# Patient Record
Sex: MALE | Race: WHITE | NOT HISPANIC OR LATINO | Employment: UNEMPLOYED | URBAN - METROPOLITAN AREA
[De-identification: names, ages, dates, MRNs, and addresses within clinical notes are randomized per-mention and may not be internally consistent; named-entity substitution may affect disease eponyms.]

---

## 2017-01-01 ENCOUNTER — APPOINTMENT (OUTPATIENT)
Dept: RADIOLOGY | Facility: MEDICAL CENTER | Age: 62
DRG: 268 | End: 2017-01-01
Attending: HOSPITALIST
Payer: MEDICAID

## 2017-01-01 ENCOUNTER — APPOINTMENT (OUTPATIENT)
Dept: RADIOLOGY | Facility: MEDICAL CENTER | Age: 62
DRG: 268 | End: 2017-01-01
Attending: SURGERY
Payer: MEDICAID

## 2017-01-01 ENCOUNTER — HOSPITAL ENCOUNTER (INPATIENT)
Facility: MEDICAL CENTER | Age: 62
LOS: 1 days | DRG: 268 | End: 2017-03-06
Attending: INTERNAL MEDICINE | Admitting: INTERNAL MEDICINE
Payer: MEDICAID

## 2017-01-01 ENCOUNTER — RESOLUTE PROFESSIONAL BILLING HOSPITAL PROF FEE (OUTPATIENT)
Dept: HOSPITALIST | Facility: MEDICAL CENTER | Age: 62
End: 2017-01-01
Payer: MEDICAID

## 2017-01-01 VITALS
BODY MASS INDEX: 22.98 KG/M2 | HEART RATE: 43 BPM | TEMPERATURE: 98 F | HEIGHT: 67 IN | WEIGHT: 146.39 LBS | RESPIRATION RATE: 24 BRPM | OXYGEN SATURATION: 57 %

## 2017-01-01 LAB
ABO GROUP BLD: NORMAL
ABO GROUP BLD: NORMAL
ALBUMIN SERPL BCP-MCNC: 1.6 G/DL (ref 3.2–4.9)
ALBUMIN SERPL BCP-MCNC: 2 G/DL (ref 3.2–4.9)
ALBUMIN/GLOB SERPL: 1 G/DL
ALBUMIN/GLOB SERPL: ABNORMAL G/DL
ALP SERPL-CCNC: 26 U/L (ref 30–99)
ALP SERPL-CCNC: 42 U/L (ref 30–99)
ALT SERPL-CCNC: 225 U/L (ref 2–50)
ALT SERPL-CCNC: 30 U/L (ref 2–50)
ANION GAP SERPL CALC-SCNC: 10 MMOL/L (ref 0–11.9)
ANION GAP SERPL CALC-SCNC: 18 MMOL/L (ref 0–11.9)
ANISOCYTOSIS BLD QL SMEAR: ABNORMAL
AST SERPL-CCNC: 276 U/L (ref 12–45)
AST SERPL-CCNC: 40 U/L (ref 12–45)
BARCODED ABORH UBTYP: 5100
BARCODED ABORH UBTYP: 600
BARCODED ABORH UBTYP: 600
BARCODED ABORH UBTYP: 6200
BARCODED ABORH UBTYP: 7300
BARCODED ABORH UBTYP: 8400
BARCODED ABORH UBTYP: 8400
BARCODED PRD CODE UBPRD: NORMAL
BARCODED UNIT NUM UBUNT: NORMAL
BASE EXCESS BLDA CALC-SCNC: -12 MMOL/L (ref -4–3)
BASE EXCESS BLDA CALC-SCNC: -15 MMOL/L (ref -4–3)
BASE EXCESS BLDA CALC-SCNC: -19 MMOL/L (ref -4–3)
BASE EXCESS BLDA CALC-SCNC: -4 MMOL/L (ref -4–3)
BASE EXCESS BLDA CALC-SCNC: -6 MMOL/L (ref -4–3)
BASE EXCESS BLDA CALC-SCNC: -9 MMOL/L (ref -4–3)
BASOPHILS # BLD AUTO: 0 % (ref 0–1.8)
BASOPHILS # BLD: 0 K/UL (ref 0–0.12)
BILIRUB SERPL-MCNC: 0.8 MG/DL (ref 0.1–1.5)
BILIRUB SERPL-MCNC: 1 MG/DL (ref 0.1–1.5)
BLD GP AB SCN SERPL QL: NORMAL
BODY TEMPERATURE: ABNORMAL DEGREES
BUN SERPL-MCNC: 24 MG/DL (ref 8–22)
BUN SERPL-MCNC: 28 MG/DL (ref 8–22)
CA-I BLD ISE-SCNC: 0.47 MMOL/L (ref 1.1–1.3)
CA-I BLD ISE-SCNC: 0.67 MMOL/L (ref 1.1–1.3)
CA-I BLD ISE-SCNC: 0.89 MMOL/L (ref 1.1–1.3)
CA-I BLD ISE-SCNC: 0.91 MMOL/L (ref 1.1–1.3)
CA-I BLD ISE-SCNC: 0.93 MMOL/L (ref 1.1–1.3)
CA-I BLD ISE-SCNC: 0.95 MMOL/L (ref 1.1–1.3)
CALCIUM SERPL-MCNC: 6.7 MG/DL (ref 8.5–10.5)
CALCIUM SERPL-MCNC: 7 MG/DL (ref 8.5–10.5)
CFT BLD TEG: 6.8 MIN (ref 5–10)
CFT BLD TEG: 7.7 MIN (ref 5–10)
CFT P HPASE BLD TEG: 6.6 MIN (ref 5–10)
CHLORIDE SERPL-SCNC: 105 MMOL/L (ref 96–112)
CHLORIDE SERPL-SCNC: 106 MMOL/L (ref 96–112)
CLOT ANGLE BLD TEG: 40.2 DEGREES (ref 53–72)
CLOT ANGLE BLD TEG: 50.5 DEGREES (ref 53–72)
CLOT ANGLE P HPASE BLD TEG: 52.9 DEGREES (ref 53–72)
CLOT INIT P HPASE BLD TEG: 3.2 MIN (ref 1–3)
CLOT LYSIS 30M P MA LENFR BLD TEG: 0 % (ref 0–8)
CO2 BLDA-SCNC: 15 MMOL/L (ref 20–33)
CO2 BLDA-SCNC: 18 MMOL/L (ref 20–33)
CO2 BLDA-SCNC: 20 MMOL/L (ref 20–33)
CO2 BLDA-SCNC: 20 MMOL/L (ref 20–33)
CO2 BLDA-SCNC: 23 MMOL/L (ref 20–33)
CO2 BLDA-SCNC: 24 MMOL/L (ref 20–33)
CO2 SERPL-SCNC: 17 MMOL/L (ref 20–33)
CO2 SERPL-SCNC: 18 MMOL/L (ref 20–33)
COMPONENT FT 8504FT: NORMAL
COMPONENT P 8504P: NORMAL
COMPONENT R 8504R: NORMAL
CREAT SERPL-MCNC: 1.32 MG/DL (ref 0.5–1.4)
CREAT SERPL-MCNC: 1.55 MG/DL (ref 0.5–1.4)
CT.EXTRINSIC BLD ROTEM: 3.8 MIN (ref 1–3)
CT.EXTRINSIC BLD ROTEM: 4.8 MIN (ref 1–3)
EOSINOPHIL # BLD AUTO: 0 K/UL (ref 0–0.51)
EOSINOPHIL NFR BLD: 0 % (ref 0–6.9)
ERYTHROCYTE [DISTWIDTH] IN BLOOD BY AUTOMATED COUNT: 45.1 FL (ref 35.9–50)
ERYTHROCYTE [DISTWIDTH] IN BLOOD BY AUTOMATED COUNT: 49.7 FL (ref 35.9–50)
GFR SERPL CREATININE-BSD FRML MDRD: 46 ML/MIN/1.73 M 2
GFR SERPL CREATININE-BSD FRML MDRD: 55 ML/MIN/1.73 M 2
GLOBULIN SER CALC-MCNC: 2 G/DL (ref 1.9–3.5)
GLOBULIN SER CALC-MCNC: ABNORMAL G/DL (ref 1.9–3.5)
GLUCOSE BLD-MCNC: 139 MG/DL (ref 65–99)
GLUCOSE BLD-MCNC: 155 MG/DL (ref 65–99)
GLUCOSE BLD-MCNC: 187 MG/DL (ref 65–99)
GLUCOSE BLD-MCNC: 209 MG/DL (ref 65–99)
GLUCOSE BLD-MCNC: 212 MG/DL (ref 65–99)
GLUCOSE SERPL-MCNC: 144 MG/DL (ref 65–99)
GLUCOSE SERPL-MCNC: 146 MG/DL (ref 65–99)
HCO3 BLDA-SCNC: 13.2 MMOL/L (ref 17–25)
HCO3 BLDA-SCNC: 15.8 MMOL/L (ref 17–25)
HCO3 BLDA-SCNC: 18.1 MMOL/L (ref 17–25)
HCO3 BLDA-SCNC: 18.7 MMOL/L (ref 17–25)
HCO3 BLDA-SCNC: 21.2 MMOL/L (ref 17–25)
HCO3 BLDA-SCNC: 22.9 MMOL/L (ref 17–25)
HCT VFR BLD AUTO: 30 % (ref 42–52)
HCT VFR BLD AUTO: 35.2 % (ref 42–52)
HCT VFR BLD CALC: 15 % (ref 42–52)
HCT VFR BLD CALC: 16 % (ref 42–52)
HCT VFR BLD CALC: 23 % (ref 42–52)
HCT VFR BLD CALC: 23 % (ref 42–52)
HCT VFR BLD CALC: 25 % (ref 42–52)
HCT VFR BLD CALC: 28 % (ref 42–52)
HGB BLD-MCNC: 11.6 G/DL (ref 14–18)
HGB BLD-MCNC: 5.1 G/DL (ref 14–18)
HGB BLD-MCNC: 5.4 G/DL (ref 14–18)
HGB BLD-MCNC: 7.8 G/DL (ref 14–18)
HGB BLD-MCNC: 7.8 G/DL (ref 14–18)
HGB BLD-MCNC: 8.5 G/DL (ref 14–18)
HGB BLD-MCNC: 9.5 G/DL (ref 14–18)
HGB BLD-MCNC: 9.9 G/DL (ref 14–18)
INR PPP: 2.51 (ref 0.87–1.13)
LACTATE BLD-SCNC: 4.2 MMOL/L (ref 0.5–2)
LYMPHOCYTES # BLD AUTO: 0.63 K/UL (ref 1–4.8)
LYMPHOCYTES NFR BLD: 3.5 % (ref 22–41)
MAGNESIUM SERPL-MCNC: 2.1 MG/DL (ref 1.5–2.5)
MANUAL DIFF BLD: NORMAL
MCF BLD TEG: 46.6 MM (ref 50–70)
MCF BLD TEG: 51.9 MM (ref 50–70)
MCF P HPASE BLD TEG: 51.9 MM (ref 50–70)
MCH RBC QN AUTO: 29.1 PG (ref 27–33)
MCH RBC QN AUTO: 30.9 PG (ref 27–33)
MCHC RBC AUTO-ENTMCNC: 33 G/DL (ref 33.7–35.3)
MCHC RBC AUTO-ENTMCNC: 33 G/DL (ref 33.7–35.3)
MCV RBC AUTO: 88.2 FL (ref 81.4–97.8)
MCV RBC AUTO: 93.8 FL (ref 81.4–97.8)
MICROCYTES BLD QL SMEAR: ABNORMAL
MONOCYTES # BLD AUTO: 0.47 K/UL (ref 0–0.85)
MONOCYTES NFR BLD AUTO: 2.6 % (ref 0–13.4)
MORPHOLOGY BLD-IMP: NORMAL
MYELOCYTES NFR BLD MANUAL: 1.7 %
NEUTROPHILS # BLD AUTO: 16.5 K/UL (ref 1.82–7.42)
NEUTROPHILS NFR BLD: 89.6 % (ref 44–72)
NEUTS BAND NFR BLD MANUAL: 2.6 % (ref 0–10)
NRBC # BLD AUTO: 0.07 K/UL
NRBC BLD AUTO-RTO: 0.4 /100 WBC
O2/TOTAL GAS SETTING VFR VENT: 100 %
PA AA BLD-ACNC: 94.8 %
PA ADP BLD-ACNC: 90 %
PCO2 BLDA: 48.7 MMHG (ref 26–37)
PCO2 BLDA: 52.6 MMHG (ref 26–37)
PCO2 BLDA: 54 MMHG (ref 26–37)
PCO2 BLDA: 65.1 MMHG (ref 26–37)
PCO2 BLDA: 65.8 MMHG (ref 26–37)
PCO2 BLDA: 66.8 MMHG (ref 26–37)
PCO2 TEMP ADJ BLDA: 48.7 MMHG (ref 26–37)
PCO2 TEMP ADJ BLDA: 50.3 MMHG (ref 26–37)
PCO2 TEMP ADJ BLDA: 54 MMHG (ref 26–37)
PCO2 TEMP ADJ BLDA: 61.4 MMHG (ref 26–37)
PCO2 TEMP ADJ BLDA: 65.1 MMHG (ref 26–37)
PCO2 TEMP ADJ BLDA: 66.8 MMHG (ref 26–37)
PH BLDA: 6.9 [PH] (ref 7.4–7.5)
PH BLDA: 6.99 [PH] (ref 7.4–7.5)
PH BLDA: 7.05 [PH] (ref 7.4–7.5)
PH BLDA: 7.15 [PH] (ref 7.4–7.5)
PH BLDA: 7.21 [PH] (ref 7.4–7.5)
PH BLDA: 7.28 [PH] (ref 7.4–7.5)
PH TEMP ADJ BLDA: 6.9 [PH] (ref 7.4–7.5)
PH TEMP ADJ BLDA: 7.01 [PH] (ref 7.4–7.5)
PH TEMP ADJ BLDA: 7.05 [PH] (ref 7.4–7.5)
PH TEMP ADJ BLDA: 7.15 [PH] (ref 7.4–7.5)
PH TEMP ADJ BLDA: 7.23 [PH] (ref 7.4–7.5)
PH TEMP ADJ BLDA: 7.28 [PH] (ref 7.4–7.5)
PLATELET # BLD AUTO: 67 K/UL (ref 164–446)
PLATELET # BLD AUTO: 89 K/UL (ref 164–446)
PLATELET BLD QL SMEAR: NORMAL
PMV BLD AUTO: 9.3 FL (ref 9–12.9)
PMV BLD AUTO: 9.9 FL (ref 9–12.9)
PO2 BLDA: 239 MMHG (ref 64–87)
PO2 BLDA: 25 MMHG (ref 64–87)
PO2 BLDA: 277 MMHG (ref 64–87)
PO2 BLDA: 327 MMHG (ref 64–87)
PO2 BLDA: 414 MMHG (ref 64–87)
PO2 BLDA: 425 MMHG (ref 64–87)
PO2 TEMP ADJ BLDA: 23 MMHG (ref 64–87)
PO2 TEMP ADJ BLDA: 239 MMHG (ref 64–87)
PO2 TEMP ADJ BLDA: 277 MMHG (ref 64–87)
PO2 TEMP ADJ BLDA: 322 MMHG (ref 64–87)
PO2 TEMP ADJ BLDA: 414 MMHG (ref 64–87)
PO2 TEMP ADJ BLDA: 425 MMHG (ref 64–87)
POTASSIUM BLD-SCNC: 4.7 MMOL/L (ref 3.6–5.5)
POTASSIUM BLD-SCNC: 5.5 MMOL/L (ref 3.6–5.5)
POTASSIUM BLD-SCNC: 5.5 MMOL/L (ref 3.6–5.5)
POTASSIUM BLD-SCNC: 5.6 MMOL/L (ref 3.6–5.5)
POTASSIUM BLD-SCNC: 5.8 MMOL/L (ref 3.6–5.5)
POTASSIUM BLD-SCNC: 6 MMOL/L (ref 3.6–5.5)
POTASSIUM SERPL-SCNC: 5 MMOL/L (ref 3.6–5.5)
POTASSIUM SERPL-SCNC: 5.2 MMOL/L (ref 3.6–5.5)
PRODUCT TYPE UPROD: NORMAL
PROT SERPL-MCNC: 4 G/DL (ref 6–8.2)
PROT SERPL-MCNC: <3 G/DL (ref 6–8.2)
PROTHROMBIN TIME: 27.9 SEC (ref 12–14.6)
RBC # BLD AUTO: 3.2 M/UL (ref 4.7–6.1)
RBC # BLD AUTO: 3.99 M/UL (ref 4.7–6.1)
RBC BLD AUTO: PRESENT
RH BLD: NORMAL
SAO2 % BLDA: 100 % (ref 93–99)
SAO2 % BLDA: 23 % (ref 93–99)
SAO2 % BLDA: 99 % (ref 93–99)
SODIUM BLD-SCNC: 134 MMOL/L (ref 135–145)
SODIUM BLD-SCNC: 138 MMOL/L (ref 135–145)
SODIUM BLD-SCNC: 138 MMOL/L (ref 135–145)
SODIUM BLD-SCNC: 139 MMOL/L (ref 135–145)
SODIUM BLD-SCNC: 140 MMOL/L (ref 135–145)
SODIUM BLD-SCNC: 146 MMOL/L (ref 135–145)
SODIUM SERPL-SCNC: 132 MMOL/L (ref 135–145)
SODIUM SERPL-SCNC: 142 MMOL/L (ref 135–145)
SPECIMEN DRAWN FROM PATIENT: ABNORMAL
TEG ALGORITHM TGALG: ABNORMAL
TEG ALGORITHM TGALG: ABNORMAL
UNIT STATUS USTAT: NORMAL
WBC # BLD AUTO: 17.9 K/UL (ref 4.8–10.8)
WBC # BLD AUTO: 5.2 K/UL (ref 4.8–10.8)

## 2017-01-01 PROCEDURE — C1751 CATH, INF, PER/CENT/MIDLINE: HCPCS | Performed by: SURGERY

## 2017-01-01 PROCEDURE — A7000 DISPOSABLE CANISTER FOR PUMP: HCPCS | Performed by: INTERNAL MEDICINE

## 2017-01-01 PROCEDURE — 99291 CRITICAL CARE FIRST HOUR: CPT | Mod: 25 | Performed by: SURGERY

## 2017-01-01 PROCEDURE — 30243N1 TRANSFUSION OF NONAUTOLOGOUS RED BLOOD CELLS INTO CENTRAL VEIN, PERCUTANEOUS APPROACH: ICD-10-PCS | Performed by: ANESTHESIOLOGY

## 2017-01-01 PROCEDURE — 5A1935Z RESPIRATORY VENTILATION, LESS THAN 24 CONSECUTIVE HOURS: ICD-10-PCS | Performed by: EMERGENCY MEDICINE

## 2017-01-01 PROCEDURE — 85007 BL SMEAR W/DIFF WBC COUNT: CPT

## 2017-01-01 PROCEDURE — 700105 HCHG RX REV CODE 258

## 2017-01-01 PROCEDURE — 700111 HCHG RX REV CODE 636 W/ 250 OVERRIDE (IP): Performed by: PHARMACIST

## 2017-01-01 PROCEDURE — 49000 EXPLORATION OF ABDOMEN: CPT | Performed by: SURGERY

## 2017-01-01 PROCEDURE — C1894 INTRO/SHEATH, NON-LASER: HCPCS | Performed by: SURGERY

## 2017-01-01 PROCEDURE — P9017 PLASMA 1 DONOR FRZ W/IN 8 HR: HCPCS | Mod: 91

## 2017-01-01 PROCEDURE — 110382 HCHG SHELL REV 271: Performed by: SURGERY

## 2017-01-01 PROCEDURE — 85347 COAGULATION TIME ACTIVATED: CPT | Mod: 91

## 2017-01-01 PROCEDURE — 92950 HEART/LUNG RESUSCITATION CPR: CPT

## 2017-01-01 PROCEDURE — 160048 HCHG OR STATISTICAL LEVEL 1-5: Performed by: SURGERY

## 2017-01-01 PROCEDURE — 85027 COMPLETE CBC AUTOMATED: CPT | Mod: 91

## 2017-01-01 PROCEDURE — 770022 HCHG ROOM/CARE - ICU (200)

## 2017-01-01 PROCEDURE — A4606 OXYGEN PROBE USED W OXIMETER: HCPCS | Performed by: SURGERY

## 2017-01-01 PROCEDURE — 160041 HCHG SURGERY MINUTES - EA ADDL 1 MIN LEVEL 4: Performed by: SURGERY

## 2017-01-01 PROCEDURE — 502240 HCHG MISC OR SUPPLY RC 0272: Performed by: SURGERY

## 2017-01-01 PROCEDURE — 500002 HCHG ADHESIVE, DERMABOND: Performed by: SURGERY

## 2017-01-01 PROCEDURE — 700101 HCHG RX REV CODE 250

## 2017-01-01 PROCEDURE — 85384 FIBRINOGEN ACTIVITY: CPT | Mod: 91

## 2017-01-01 PROCEDURE — 04V03E6 RESTRICT ABD AORTA, BIFURC, W FENESTR DEV 1 OR 2, PERC: ICD-10-PCS | Performed by: SURGERY

## 2017-01-01 PROCEDURE — 700111 HCHG RX REV CODE 636 W/ 250 OVERRIDE (IP)

## 2017-01-01 PROCEDURE — 84132 ASSAY OF SERUM POTASSIUM: CPT | Mod: 91

## 2017-01-01 PROCEDURE — 3E043XZ INTRODUCTION OF VASOPRESSOR INTO CENTRAL VEIN, PERCUTANEOUS APPROACH: ICD-10-PCS | Performed by: INTERNAL MEDICINE

## 2017-01-01 PROCEDURE — 500257: Performed by: SURGERY

## 2017-01-01 PROCEDURE — 85014 HEMATOCRIT: CPT | Mod: 91

## 2017-01-01 PROCEDURE — P9016 RBC LEUKOCYTES REDUCED: HCPCS | Mod: 91

## 2017-01-01 PROCEDURE — 86850 RBC ANTIBODY SCREEN: CPT

## 2017-01-01 PROCEDURE — 501499 HCHG SURG-I-LOOP, MINI (BLUE): Performed by: SURGERY

## 2017-01-01 PROCEDURE — 0BH18EZ INSERTION OF ENDOTRACHEAL AIRWAY INTO TRACHEA, VIA NATURAL OR ARTIFICIAL OPENING ENDOSCOPIC: ICD-10-PCS | Performed by: EMERGENCY MEDICINE

## 2017-01-01 PROCEDURE — 80053 COMPREHEN METABOLIC PANEL: CPT | Mod: 91

## 2017-01-01 PROCEDURE — 501500 HCHG SURG-I-LOOP, SUPER MAXI (BLUE): Performed by: SURGERY

## 2017-01-01 PROCEDURE — 86900 BLOOD TYPING SEROLOGIC ABO: CPT

## 2017-01-01 PROCEDURE — 94002 VENT MGMT INPAT INIT DAY: CPT

## 2017-01-01 PROCEDURE — 110371 HCHG SHELL REV 272: Performed by: SURGERY

## 2017-01-01 PROCEDURE — 0WJG0ZZ INSPECTION OF PERITONEAL CAVITY, OPEN APPROACH: ICD-10-PCS | Performed by: SURGERY

## 2017-01-01 PROCEDURE — C1769 GUIDE WIRE: HCPCS | Performed by: SURGERY

## 2017-01-01 PROCEDURE — 86923 COMPATIBILITY TEST ELECTRIC: CPT | Mod: 91

## 2017-01-01 PROCEDURE — 160009 HCHG ANES TIME/MIN: Performed by: SURGERY

## 2017-01-01 PROCEDURE — 500123 HCHG BOVIE, CONTROL W/BLADE: Performed by: SURGERY

## 2017-01-01 PROCEDURE — P9045 ALBUMIN (HUMAN), 5%, 250 ML: HCPCS

## 2017-01-01 PROCEDURE — 86901 BLOOD TYPING SEROLOGIC RH(D): CPT

## 2017-01-01 PROCEDURE — 501488 HCHG SUCTION CANN, WOUNDVAC TRAC: Performed by: SURGERY

## 2017-01-01 PROCEDURE — 93312 ECHO TRANSESOPHAGEAL: CPT

## 2017-01-01 PROCEDURE — 500700 HCHG HEMOCLIP, SMALL (RED): Performed by: SURGERY

## 2017-01-01 PROCEDURE — 500043 HCHG BAG-A-JET: Performed by: SURGERY

## 2017-01-01 PROCEDURE — 500896 HCHG PACK, VASCULAR (FOR ROOM 10): Performed by: SURGERY

## 2017-01-01 PROCEDURE — 0W9G0ZZ DRAINAGE OF PERITONEAL CAVITY, OPEN APPROACH: ICD-10-PCS | Performed by: SURGERY

## 2017-01-01 PROCEDURE — 82962 GLUCOSE BLOOD TEST: CPT

## 2017-01-01 PROCEDURE — 500698 HCHG HEMOCLIP, MEDIUM: Performed by: SURGERY

## 2017-01-01 PROCEDURE — 85610 PROTHROMBIN TIME: CPT | Mod: 91

## 2017-01-01 PROCEDURE — 82947 ASSAY GLUCOSE BLOOD QUANT: CPT

## 2017-01-01 PROCEDURE — P9034 PLATELETS, PHERESIS: HCPCS | Mod: 91

## 2017-01-01 PROCEDURE — 84295 ASSAY OF SERUM SODIUM: CPT | Mod: 91

## 2017-01-01 PROCEDURE — 160029 HCHG SURGERY MINUTES - 1ST 30 MINS LEVEL 4: Performed by: SURGERY

## 2017-01-01 PROCEDURE — A6402 STERILE GAUZE <= 16 SQ IN: HCPCS | Performed by: SURGERY

## 2017-01-01 PROCEDURE — 83735 ASSAY OF MAGNESIUM: CPT

## 2017-01-01 PROCEDURE — 501837 HCHG SUTURE CV: Performed by: SURGERY

## 2017-01-01 PROCEDURE — 93321 DOPPLER ECHO F-UP/LMTD STD: CPT

## 2017-01-01 PROCEDURE — 82330 ASSAY OF CALCIUM: CPT | Mod: 91

## 2017-01-01 PROCEDURE — 700105 HCHG RX REV CODE 258: Performed by: INTERNAL MEDICINE

## 2017-01-01 PROCEDURE — 85576 BLOOD PLATELET AGGREGATION: CPT | Mod: 91

## 2017-01-01 PROCEDURE — 31500 INSERT EMERGENCY AIRWAY: CPT

## 2017-01-01 PROCEDURE — 93325 DOPPLER ECHO COLOR FLOW MAPG: CPT

## 2017-01-01 PROCEDURE — 500869 HCHG NEEDLE, THINWALL 19GA (COOK): Performed by: SURGERY

## 2017-01-01 PROCEDURE — 99292 CRITICAL CARE ADDL 30 MIN: CPT | Mod: 25 | Performed by: SURGERY

## 2017-01-01 PROCEDURE — 99291 CRITICAL CARE FIRST HOUR: CPT | Performed by: INTERNAL MEDICINE

## 2017-01-01 PROCEDURE — 700105 HCHG RX REV CODE 258: Performed by: PHARMACIST

## 2017-01-01 PROCEDURE — 500122 HCHG BOVIE, BLADE: Performed by: SURGERY

## 2017-01-01 PROCEDURE — 502000 HCHG MISC OR IMPLANTS RC 0278: Performed by: SURGERY

## 2017-01-01 PROCEDURE — 36430 TRANSFUSION BLD/BLD COMPNT: CPT

## 2017-01-01 PROCEDURE — 30243K1 TRANSFUSION OF NONAUTOLOGOUS FROZEN PLASMA INTO CENTRAL VEIN, PERCUTANEOUS APPROACH: ICD-10-PCS | Performed by: ANESTHESIOLOGY

## 2017-01-01 PROCEDURE — 501445 HCHG STAPLER, SKIN DISP: Performed by: SURGERY

## 2017-01-01 PROCEDURE — 501498 HCHG SURG-I-LOOP, MAXI (BLUE): Performed by: SURGERY

## 2017-01-01 PROCEDURE — 500887 HCHG PACK, MAJOR BASIN: Performed by: SURGERY

## 2017-01-01 PROCEDURE — 82803 BLOOD GASES ANY COMBINATION: CPT | Mod: 91

## 2017-01-01 PROCEDURE — 83605 ASSAY OF LACTIC ACID: CPT

## 2017-01-01 PROCEDURE — 700111 HCHG RX REV CODE 636 W/ 250 OVERRIDE (IP): Performed by: INTERNAL MEDICINE

## 2017-01-01 PROCEDURE — 30243R1 TRANSFUSION OF NONAUTOLOGOUS PLATELETS INTO CENTRAL VEIN, PERCUTANEOUS APPROACH: ICD-10-PCS | Performed by: ANESTHESIOLOGY

## 2017-01-01 PROCEDURE — 501838 HCHG SUTURE GENERAL: Performed by: SURGERY

## 2017-01-01 PROCEDURE — 500697 HCHG HEMOCLIP, LARGE (ORANGE): Performed by: SURGERY

## 2017-01-01 PROCEDURE — 700111 HCHG RX REV CODE 636 W/ 250 OVERRIDE (IP): Performed by: EMERGENCY MEDICINE

## 2017-01-01 PROCEDURE — 500424 HCHG DRESSING, AIRSTRIP: Performed by: SURGERY

## 2017-01-01 DEVICE — IMPLANTABLE DEVICE: Type: IMPLANTABLE DEVICE | Status: FUNCTIONAL

## 2017-01-01 RX ORDER — MIDAZOLAM HYDROCHLORIDE 1 MG/ML
1-5 INJECTION INTRAMUSCULAR; INTRAVENOUS
Status: DISCONTINUED | OUTPATIENT
Start: 2017-01-01 | End: 2017-03-07 | Stop reason: HOSPADM

## 2017-01-01 RX ORDER — ONDANSETRON 2 MG/ML
4 INJECTION INTRAMUSCULAR; INTRAVENOUS EVERY 4 HOURS PRN
Status: CANCELLED | OUTPATIENT
Start: 2017-01-01

## 2017-01-01 RX ORDER — CALCIUM CHLORIDE 100 MG/ML
INJECTION INTRAVENOUS; INTRAVENTRICULAR
Status: COMPLETED
Start: 2017-01-01 | End: 2017-01-01

## 2017-01-01 RX ORDER — SUCCINYLCHOLINE CHLORIDE 20 MG/ML
100 INJECTION INTRAMUSCULAR; INTRAVENOUS ONCE
Status: COMPLETED | OUTPATIENT
Start: 2017-01-01 | End: 2017-01-01

## 2017-01-01 RX ORDER — MORPHINE SULFATE 4 MG/ML
1-5 INJECTION, SOLUTION INTRAMUSCULAR; INTRAVENOUS
Status: DISCONTINUED | OUTPATIENT
Start: 2017-01-01 | End: 2017-01-01

## 2017-01-01 RX ORDER — SODIUM CHLORIDE 9 MG/ML
INJECTION, SOLUTION INTRAVENOUS
Status: DISCONTINUED
Start: 2017-01-01 | End: 2017-03-07 | Stop reason: HOSPADM

## 2017-01-01 RX ORDER — MORPHINE SULFATE 4 MG/ML
1-4 INJECTION, SOLUTION INTRAMUSCULAR; INTRAVENOUS
Status: DISCONTINUED | OUTPATIENT
Start: 2017-01-01 | End: 2017-03-07 | Stop reason: HOSPADM

## 2017-01-01 RX ORDER — MORPHINE SULFATE 10 MG/ML
5 INJECTION, SOLUTION INTRAMUSCULAR; INTRAVENOUS
Status: DISCONTINUED | OUTPATIENT
Start: 2017-01-01 | End: 2017-03-07 | Stop reason: HOSPADM

## 2017-01-01 RX ORDER — IODIXANOL 270 MG/ML
INJECTION, SOLUTION INTRAVASCULAR
Status: DISCONTINUED | OUTPATIENT
Start: 2017-01-01 | End: 2017-01-01 | Stop reason: HOSPADM

## 2017-01-01 RX ORDER — EPINEPHRINE 0.1 MG/ML
SYRINGE (ML) INJECTION
Status: COMPLETED
Start: 2017-01-01 | End: 2017-01-01

## 2017-01-01 RX ORDER — ONDANSETRON 2 MG/ML
4 INJECTION INTRAMUSCULAR; INTRAVENOUS EVERY 4 HOURS PRN
Status: DISCONTINUED | OUTPATIENT
Start: 2017-01-01 | End: 2017-03-07 | Stop reason: HOSPADM

## 2017-01-01 RX ORDER — SODIUM CHLORIDE 9 MG/ML
INJECTION, SOLUTION INTRAVENOUS CONTINUOUS
Status: DISCONTINUED | OUTPATIENT
Start: 2017-01-01 | End: 2017-03-07 | Stop reason: HOSPADM

## 2017-01-01 RX ORDER — PROPOFOL 10 MG/ML
50 INJECTION, EMULSION INTRAVENOUS ONCE
Status: COMPLETED | OUTPATIENT
Start: 2017-01-01 | End: 2017-01-01

## 2017-01-01 RX ORDER — NOREPINEPHRINE BITARTRATE 1 MG/ML
INJECTION, SOLUTION INTRAVENOUS
Status: DISCONTINUED
Start: 2017-01-01 | End: 2017-03-07 | Stop reason: HOSPADM

## 2017-01-01 RX ADMIN — VASOPRESSIN 0.03 UNITS/MIN: 20 INJECTION INTRAVENOUS at 21:35

## 2017-01-01 RX ADMIN — CALCIUM CHLORIDE 1 G: 100 INJECTION, SOLUTION INTRAVENOUS; INTRAVENTRICULAR at 21:52

## 2017-01-01 RX ADMIN — SUCCINYLCHOLINE CHLORIDE 100 MG: 20 INJECTION, SOLUTION INTRAMUSCULAR; INTRAVENOUS at 17:18

## 2017-01-01 RX ADMIN — PROPOFOL 50 MG: 10 INJECTION, EMULSION INTRAVENOUS at 17:18

## 2017-01-01 RX ADMIN — SODIUM BICARBONATE: 84 INJECTION, SOLUTION INTRAVENOUS at 21:53

## 2017-01-01 RX ADMIN — EPINEPHRINE 1 MG: 0.1 INJECTION, SOLUTION ENDOTRACHEAL; INTRACARDIAC; INTRAVENOUS at 21:54

## 2017-01-01 RX ADMIN — SODIUM CHLORIDE: 9 INJECTION, SOLUTION INTRAVENOUS at 21:35

## 2017-01-01 ASSESSMENT — PAIN SCALES - GENERAL: PAINLEVEL_OUTOF10: 0

## 2017-03-06 PROBLEM — I71.40 ANEURYSM OF ABDOMINAL AORTA (HCC): Status: ACTIVE | Noted: 2017-01-01

## 2017-03-06 PROBLEM — E87.20 METABOLIC ACIDOSIS: Status: ACTIVE | Noted: 2017-01-01

## 2017-03-06 PROBLEM — J96.00 ACUTE RESPIRATORY FAILURE (HCC): Status: ACTIVE | Noted: 2017-01-01

## 2017-03-06 PROBLEM — D68.9 COAGULOPATHY (HCC): Status: ACTIVE | Noted: 2017-01-01

## 2017-03-06 PROBLEM — I71.30 RUPTURED ABDOMINAL AORTIC ANEURYSM (AAA) (HCC): Status: ACTIVE | Noted: 2017-01-01

## 2017-03-06 PROBLEM — R57.1 HYPOVOLEMIC SHOCK (HCC): Status: ACTIVE | Noted: 2017-01-01

## 2017-03-06 PROBLEM — M79.A3 NON-TRAUMATIC COMPARTMENT SYNDROME OF ABDOMEN: Status: ACTIVE | Noted: 2017-01-01

## 2017-03-06 NOTE — PROGRESS NOTES
Direct admit from [facility], Dr. Salazar.  Accepted by Dr. Tucker for ruptured abd aortic aneursym.  ADT signed & held @ 1070, needs to be released upon pt arrival.  No written orders received.  Pt coming by air. Dr. Sutherland has accepted as consult/vascular surgeon.

## 2017-03-07 LAB
INR PPP: 2.43 (ref 0.87–1.13)
PROTHROMBIN TIME: 27.2 SEC (ref 12–14.6)

## 2017-03-07 NOTE — PROGRESS NOTES
1700: Dr. Sutherland at bedside.   1705: Dr. Tucker at bedside.    1715: Orders received. Patient to go to OR, labs sent.  Patient is now obtunded, not verbally responsive, orders to intubate patient now.  Call placed to ER for ERP to come to bedside to intubate patient. One liter IVF bolus initiated prior to intubation.

## 2017-03-07 NOTE — PROGRESS NOTES
1605: Patient arrived to unit via care flight and [facility].  Unit #5 of 6 of PRBC's hanging and infusing at this time.  Levophed infusing at 15 mcg/min and vasopressin at 0.06 units/hr.  Patient is awake and alert, pale and diaphoretic, on 10 L oxy mask.    1615: Dr. Vanegas and Dr. Sutherland paged regarding patient's arrival on unit.

## 2017-03-07 NOTE — CONSULTS
DATE OF SERVICE:  03/06/2017    REASON FOR CONSULTATION:  Ruptured abdominal aortic aneurysm.    HISTORY OF PRESENT ILLNESS:  This is a 61-year-old gentleman with a history of   COPD on home oxygen, tobacco dependency and methamphetamine dependence who   presented to the emergency room at [place] with worsening cough and weakness.    The patient was found to be in septic and was treated for possible pneumonia.   He acutely decompensated earlier today and subsequently had a CT scan   performed demonstrating a ruptured abdominal aortic aneurysm with a large   retroperitoneal hematoma.  The patient was transferred to the [facility] for evaluation and treatment.  The patient wished to   have the aneurysm repaired according to the physician at the outside hospital   prior to transfer.    PAST MEDICAL HISTORY:  1.  Nicotine dependence.  2.  Chronic obstructive pulmonary disease, on home oxygen.  3.  Methamphetamine dependence.    ALLERGIES:  No known drug allergies.    CURRENT HOME MEDICATIONS:  1.  Albuterol nebulizer.  2.  Lexapro 5 mg daily.    FAMILY HISTORY:  Father had coronary artery disease and myocardial infarction.    SOCIAL HISTORY:  The patient smokes about 1 packs of cigarettes daily.  He   also has recreational drug use history.    REVIEW OF SYSTEMS:  Negative except what is stated within the history and   physical.    PHYSICAL EXAMINATION:  VITAL SIGNS:  Temperature is 36.7, pulse is 134, blood pressure is 78/51 on 10   liters with a mask.  GENERAL:  Well-developed, ill-appearing male in no acute distress.  The   patient is not responsive to verbal communication.  HEENT:  Atraumatic, normocephalic.  Pupils are equal, round, and reactive to   light.  Extraocular muscles are intact.  Mucous membranes are dry.  Poor   dentition.  NECK:  Supple.  No lymphadenopathy or carotid bruit.  Trachea is midline.  CHEST:  Symmetric expansion.  No intercostal retractions or accessory muscle    use.  RESPIRATORY:  Clear to auscultation with diminished breath sounds at bases.  CARDIOVASCULAR:  Tachycardia with regular rhythm.  No murmurs.  ABDOMEN:  Soft, nondistended with pulsatile abdominal mass noted along the   left side with firm hematoma.  EXTREMITIES:  Nonpalpable pedal pulses.  No edema.  Femoral pulses are   difficult to palpate.  NEUROLOGIC:  The patient is nonresponse.  He is moving all 4 extremities. Gross strength appeared to be intact.  SKIN:  Clammy and cold along the bilateral lower extremities.    IMAGING:  CT scan of the abdomen and pelvis demonstrates a ruptured infrarenal   abdominal aortic aneurysm with a relatively large retroperitoneal hematoma.    ASSESSMENT AND PLAN:  This is a 59-year-old gentleman with a ruptured   infrarenal abdominal aortic aneurysm.  I reviewed the CT scan.  He is a   candidate for endovascular repair.  The patient will be taken emergently to the   operating room for repair of his abdominal aortic aneurysm.  Emergency consent  was obtained with 2-physician consent.       ____________________________________     MD AMARIS HARRELL / ANIL    DD:  03/06/2017 21:42:18  DT:  03/06/2017 22:48:38    D#:  [id number]  Job#:  [id number]

## 2017-03-07 NOTE — RESPIRATORY CARE
Cardiopulmonary Resuscitation/Intubation Assist    Intubation assist performed yes  Reason for intubation resp. failure  Positive Color Change on EZCap? yes  Difficult Intubation/Number of attempts no/2  Evidence of aspiration no  Airway Group ET Tube Oral 8.0-Secured At  (cm): 24 (03/06/17 1720)                         Events/Summary/Plan: Pt. intubated in unit and then rushed to O.R. (03/06/17 1720)

## 2017-03-07 NOTE — DISCHARGE SUMMARY
Discharge Summary      DATE OF ADMISSION: 3/6/2017    DATE OF DISCHARGE: 3/6/2017    ADMISSION DIAGNOSIS (ES):  ? Ruptured AAA  ? Acidosis  ? Coagulopathy  ? Respiratory failure  ? DNR status    DISCHARGE DIAGNOSIS (ES):  ? Ruptured AAA  ? Acidosis  ? Coagulopathy  ? Respiratory failure  ? Abdominal compartment syndrome    DISCHARGE CONDITION:  ? death    CONSULTATIONS:  ? Dr. Sutherland, vascular surgery  Yamil Morales MD - general surgery, surgical critical care      PROCEDURES:  ? Endotracheal intubation  ? Endograft repair of ruptured AAA  ? Exploratory laparotomy for abdominal compartment syndrome    BRIEF HPI:  ? 61 year old male transferred from [place], [place] in shock with ruptured AAA. He was taken to the OR for endograft repair, massive resuscitation and required decompressive laparotomy. He developed worsening acidosis, coagulopathy, and shock. He was DNR status and  shortly after return to the ICU post op of PEA arrest.

## 2017-03-07 NOTE — PROGRESS NOTES
1630: Call back from Dr. Sutherland received.  Discussed patients vital signs (hypotensive, maxed on two vasopressors, tachycardic in the 120's), patient is cold clammy and diaphoretic, blood infusing.  States he will be at the bedside and to have the cat scans from [place] available.

## 2017-03-07 NOTE — OP REPORT
DATE OF SERVICE:  03/06/2017    PREOPERATIVE DIAGNOSES:  Abdominal compartment syndrome, status post ruptured   abdominal aortic aneurysm.    POSTOPERATIVE DIAGNOSES:  Abdominal compartment syndrome, status post ruptured   abdominal aortic aneurysm.    PROCEDURES PERFORMED:  Decompressive exploratory laparotomy.     SURGEON:  Yamil Morales MD     ASSISTANT:  Otilio Sutherland MD    ANESTHESIA:  General endotracheal.    ESTIMATED BLOOD LOSS:  There is no new blood loss from this portion of the   operation, but approximately 5 liters of blood was evacuated from the abdomen   from his ruptured AAA.     SPECIMENS:  None.    INDICATIONS:  The patient is a 61-year-old male transferred from [place], [place] with a ruptured abdominal aortic aneurysm.  He was intubated and   taken urgently to the operating room.  He had an endograft repair of his   ruptured abdominal aortic aneurysm by Dr. Sutherland and Dr. Richards.  I was   called as the on-call trauma surgeon to evaluate him for abdominal compartment   syndrome.  When I evaluated the patient, he had an extremely tense abdomen,   was anuric with elevated peak airway pressures on the ventilator in the   operating room.  He is therefore indicated for decompressive exploratory   laparotomy.    FINDINGS:  He had approximately 5 liters of liquid blood in his abdominal   cavity.  This was evacuated and an ABThera device was placed.     PROCEDURE IN DETAIL:  The patient was already intubated in the operating room   and his abdomen had previously been sterilely prepped and draped.  A midline   laparotomy was made with a knife.  Hemostasis in the subcutaneous region was   obtained with cautery.  The abdomen was entered sharply and approximately 5   liters of liquid blood was evacuated using the cell saving device.  The   fascial incision was then extended from just above the bladder up to the   epigastric region.  Once the entire abdomen evacuated, it was considerably   softer.   At this point, the patient was hypotensive and deemed to be   coagulopathic, no further exploration was made then to briefly explore both   gutters and the retroperitoneum to be sure there was no ongoing active   bleeding.  I then placed an ABThera temporary abdominal closure device and   stapled the sponge to the skin with skin staples.  The ABThera was hooked up   to suction with good seal.  The patient's groin dressings were sterilely   covered and he was then transported to the intensive care unit in critical   condition.  Lap, sponge, and needle counts were correct at the conclusion of   my case.  Plan will be return to the operating room within 24-48 hours for   reexploration and evaluation for fascial closure.       ____________________________________     MD JESSICA Howard / NTS    DD:  03/06/2017 22:08:02  DT:  03/06/2017 22:19:53    D#:  [id number]  Job#:  [id number]

## 2017-03-07 NOTE — H&P
CHIEF COMPLAINT:  Transferred from outside hospital for ruptured AAA.    HISTORY OF PRESENT ILLNESS:  The patient is a 61-year-old male with a history   of hypertension, meth use, COPD.  Also, has a history of tobacco abuse.  All   the history is obtained both from the chart review and talking to the outside   physician as the patient is completely obtunded at this time.  Patient   presented to the hospital in [place] on the 4th with shortness of breath, chest   pain, and pneumonia.  He had HCAP and sepsis and he was treated accordingly   with sepsis protocol appropriate antibiotics.  The patient did well actually   and was improving and this morning had sudden change in blood pressure, had   sudden left-sided pain, left testicular pain, his hemoglobin dropped from 10   to 6.  I gave him 4 units of packed red blood cells, positive central line,   put him on Levophed.  We got a stat CTA abdomen and pelvis that showed a   ruptured infrarenal abdominal aortic aneurysm, active hemorrhage directed   laterally to the left of large retroperitoneal hematoma measuring 26 cm,   craniocaudad by 15 cm, AP by 15 cm transverse displacing the left kidney and   abdominal structures anteriorly and superiorly.  Also, it has associated small   volume hemoperitoneum.  There was also a pleural base consolidation change   posteriorly in the lower lobes with a low density pleural effusion,   concomitant pneumonia superimposed on underlying edema not excluded.  Also   showed fatty liver of, a few small subcentimeter low density consistent   with no spondylosis or prominent L5-S1.    The patient was emergently transferred here in the CICU.  Dr. Sutherland evaluated   CAT scans, recommend he emergently go to the OR.  He was not intubated.  His   breathing was shallow, tachypneic in the high 30s and unresponsive.  We   emergently intubated with Dr. Galdamez.  Patient is emergently going to the OR   now.    PAST MEDICAL HISTORY:  Per chart review  appears to be:  1.  Meth use.  2.  Tobacco abuse.  3.  Hypertension.  4.  COPD.    PAST SURGICAL HISTORY:  Unobtainable due to the patient's clinical condition.    SOCIAL HISTORY:  Unobtainable due to the patient's clinical condition.    FAMILY HISTORY:  Unobtainable due to the patient's clinical condition.    REVIEW OF SYSTEMS:  Unobtainable due to the patient's clinical condition.    PHYSICAL EXAMINATION:  VITAL SIGNS:  Temperature 36.7, heart rate 43, respiratory rate in the high   20s, pulse is 57, blood pressure 142/82, oxygen saturation 100% on 10 liters   facemask.  GENERAL:  Patient is a pale-appearing, tachypneic, unresponsive, A and O x0.  HEENT:  Very poor dentition, dry mucous membranes.  Pupils are sluggish, but   equal.  CARDIOVASCULAR:  Sinus bradycardia.  No murmurs, rubs, gallops.  Nondisplaced   PMI.  LUNGS:  He has poor breath sounds throughout, poor inspiratory and expiratory   effort.  He is tachypneic.  ABDOMEN:  Scaphoid, nontender.  He is tense.  I cannot hear any active bowel   sounds.  EXTREMITIES:  He is somewhat cool peripherally, 1+ DP pulses at most, faint   femoral pulses, decent radial pulses.  NEUROLOGICAL:  Cannot obtain due to patient's clinical condition.  MUSCULOSKELETAL:  Does not move any limbs.  ____ due to patient's clinical   condition.  SKIN:  No obvious rashes, lesions, excoriations.  PSYCHOLOGICAL:  A and O x0, obtunded.    LABORATORY DATA AND IMAGING:  Outside labs showed a white blood count   initially of 28.3, H and H 10.6 and 31.8.  Urine drug screen is negative.    Creatinine at that time was 1.62.  Glycohemoglobin was 6.2, sodium 128.  CT   imaging as noted above.  Labs here, white blood cell count 17.9, H and H 11.6   and 35.2, platelet count is 89.  Sodium 132, potassium 5.0, bicarbonate 17, glucose 146, BUN and creatinine 20   and 1.32, AST and ALT 40 and 30.  Lactic acid is 4.2.  PT/INR 27.9 and 2.51.    ASSESSMENT:  1.  Hypovolemic shock.  2.   Hemoperitoneum.  3.  Ruptured infrarenal abdominal aortic aneurysm.  4.  Acute kidney injury.  5.  Ventilator-dependent respiratory failure.  6.  Recent sepsis with healthcare-associated pneumonia.  7.  Chronic obstructive pulmonary disease.  8.  Methamphetamine use.  9.  Tobacco abuse.  10.  Hypertension.  11.  Lactic acidosis, likely combination of hypovolemic shock and septic   shock.    PLAN:  1.  Patient will be emergently taken to the OR.  His mortality risk is very   high, probably over 60-70%.  Dr. Sutherland reviewed the CAT scan images and the   patient is going to go for emergent surgical correction of these issues.    Blood pressure was initially hard to obtain, but we are able to bring it back   up.  We are going to do max vasopressin, max Levophed.  He does have a central   line placed.  I will ask Dr. Coles of the ER to emergently intubate the   patient.  We will try to wean down the oxygen as much as possible.    Additionally, we have already prepared multiple units of packed red blood   cells for the OR.  I think he has already received 4 units there.  His   hemoglobin has actually rebounded to bay here at 11.6 and 35.2, apparently was   6 this morning.  I am also going to initiate antibiotics as well.  We will   likely do vancomycin and Zosyn and we will go from there.  2.  Code status is DNR per patient.  If he codes on the table, the plan is not   to resuscitate him.    Deep venous thrombosis prophylaxis, none secondary to massive AAA rupture.  We   will do bilateral SCDs.    DIET:  N.p.o.    Forty-eight minutes of critical care time was spent on this patient excluding   any procedure or overlaps and including in conjunction and consultation with   multiple other critical care doctors.       ____________________________________     MD KATE NOGUEIRA / ANIL    DD:  03/06/2017 18:31:26  DT:  03/06/2017 19:36:38    D#:  [id number]  Job#:  [id number]

## 2017-03-07 NOTE — ED PROVIDER NOTES
I was called to the patient's room to intubate prior to going to the operating room.    Patient was unable to give any history due to his condition. The patient apparently has been admitted for a ruptured abdominal aortic aneurysm and is going to go emergently to the operating room. He was noted to have a decreasing blood pressure and mental status and intubation was felt indicated.    Patient was given 40 mg of propofol followed by 100 mg of succinylcholine. Initial intubation unsuccessful due to inability to visualize landmarks (patient had a relatively small mouth and a significant amount of facial hair. I immediately discontinued and had respiratory therapy continued bagging the patient (patient had been vigorously preoxygenated prior to initial attempt) after approximately 30 seconds 2nd attempt at intubation with Glidescope was easily successful with an 8.0 tube. Tube placement confirmed by direct visualization of tube passing through cords on scope, bilateral auscultation.    Impression emergent intubation

## 2017-03-07 NOTE — PROGRESS NOTES
"  Trauma/Surgical Progress Note    Author: Yamil Morales Date & Time created: 3/6/2017   10:18 PM     Interval Events:  Transferred via air, direct admit to ICU for ruptured AAA, shock with multiple pressors. Seen by hospitalists pre op. Intubated by ER physician Dr. Galdamez. Taken to OR for AAA endograft repair. Surgical critical care consulted for management of coagulopathy, resuscitation, management of open abdomen and compartment syndrome, vent management.    Hemodynamics:  Pulse 43, temperature 36.7 °C (98 °F), resp. rate 24, height 1.702 m (5' 7\"), weight 66.4 kg (146 lb 6.2 oz), SpO2 57 %.     Respiratory:  Rodríguez Vent Mode: APVCMV, Rate (breaths/min): 16, PEEP/CPAP: 8, FiO2: 100, P Peak (PIP): 25 Respiration: (!) 24, Pulse Oximetry: (!) 57 %     Work Of Breathing / Effort: Vented  RUL Breath Sounds: Diminished, RML Breath Sounds: Diminished, RLL Breath Sounds: Diminished, BEN Breath Sounds: Diminished, LLL Breath Sounds: Diminished  Fluids:    Intake/Output Summary (Last 24 hours) at 03/06/17 2218  Last data filed at 03/06/17 2119   Gross per 24 hour   Intake 91328.36 ml   Output   3200 ml   Net 8880.36 ml     Admit Weight: 66.4 kg (146 lb 6.2 oz)  Current Weight: 66.4 kg (146 lb 6.2 oz)    Physical Exam   Constitutional:   intubated   HENT:   Head: Normocephalic and atraumatic.   Eyes: EOM are normal. Pupils are equal, round, and reactive to light.   Neck:   Right IJ CVC in place   Cardiovascular:   Tachycardic, regular   Pulmonary/Chest: Effort normal and breath sounds normal.   Abdominal:   Abthera in place   Genitourinary:   Garcia in place   Musculoskeletal: Normal range of motion. He exhibits edema.   Neurological:   Sedated   Skin: Skin is dry.   Psychiatric:   Unable to assess     Nursing note and vitals reviewed.      Medical Decision Making/Problem List:    Active Hospital Problems    Diagnosis   • Hypovolemic shock (CMS-HCC) [R57.1]     Priority: High     Give 3 red boxes plus cell saver in " OR  Massive transfusion and resuscitative efforts ongoing  Checking endpoints of resuscitation every 6 hours     • Ruptured abdominal aortic aneurysm (AAA) (CMS-HCC) [I71.3]     Priority: High     AAA with rupture in [place], [place]  OR upon transfer for aortic stent grafting  Dr. Sutherland, vascular surgery     • Acute respiratory failure (CMS-[facility]) [J96.00]     Priority: High     SICU vent weaning protocol ordered     • Non-traumatic compartment syndrome of abdomen [M79.A3]     Priority: High     Anuria, tense abdomen in OR after AAA endograft repair  Exploratory laparotomy, drainage of large volume hemoperitoneum, ABthera placement in OR  Yamil Morales MD, general surgery     • Metabolic acidosis [E87.2]     Priority: High     Profound metabolic acidosis in OR  Bicarb given, aggressive resuscitation with blood products  Follow ABG     • Coagulopathy (CMS-HCC) [D68.9]     Priority: High     INR significantly elevated after massive transfusion  TEG pending       Core Measures & Quality Metrics:  Labs reviewed, Medications reviewed and Radiology images reviewed  Garcia catheter: Critically Ill - Requiring Accurate Measurement of Urinary Output  Central line in place: Shock    DVT Prophylaxis: Contraindicated - Anemia requiring blood transfusion  DVT prophylaxis - mechanical: SCDs  Ulcer prophylaxis: Yes      Plan:  Aggressive hemostatic resuscitation. Follow endpoints of resuscitation. Pressors as needed.  TEG, correction of coagulopathy.  Ventilator support.  Follow UOP, electrolytes.    MICHELLE Score  Discussed patient condition with RN, RT and Dr. Vasquez and Dr. Sutherland.  The patient is/remains critically ill with respiratory failure, hypovolemic shock, coagulopathy, abdominal compartment syndrome.    I provided the following critical care services: management of above.    Critical care time spent exclusive of procedures: 150 minutes thus far    Yamil Morales MD  [phone]

## 2017-03-07 NOTE — OR SURGEON
Immediate Post-Operative Note      PreOp Diagnosis: Ruptured abdominal aortic aneurysm.     PostOp Diagnosis: Same    Procedure(s):  AAA WITH STENT GRAFT - Wound Class: Clean  EXPLORATORY LAPAROTOMY AND WOUND VAC PLACEMENT - Wound Class: Clean Contaminated    Surgeon(s):  MASOOD Fenton M.D. Faheem Akhtar, M.D. George F Sieffert, M.D.    Anesthesiologist/Type of Anesthesia:  Anesthesiologist: Fercho Borjas Jr., M.D.; Fred Vasquez M.D.  Anesthesia Technician: Mario Lozada/General    Surgical Staff:  Cell Saver : Teresa Montesinos  Circulator: Heaven Vargas R.N.; Rosa Estrada R.N.  Relief Circulator: Carola Bowen R.N.; Rosa Nelson R.N.  Relief Scrub: Kate Carpio Jr.  Scrub Person: Jovan Dobson R.N.  Radiology Technician: Shamar Livingston    Specimen: None    Estimated Blood Loss: See anesthesia record    Findings: Successful exclusion of abdominal aortic aneurysm with stent graft. Compartment syndrome requiring x-lap with wound vac.     Complications: None        3/6/2017 9:29 PM Otilio Sutherland

## 2017-03-07 NOTE — CARE PLAN
Problem: Skin Integrity  Goal: Skin Integrity is maintained or improved  Patient turned, assessed around all medical devices, two RN skin check on admit.  Mepilex placed to sacrum,.     Problem: Hemodynamic Status  Goal: Vital Signs and Fluid Balance Management  Patient's blood pressure checked every fifteen minutes. Vasopressors infusing to maintain a MAP > 65

## 2017-03-07 NOTE — OR NURSING
Pt arm band checked, correct pt confirmed. Pt intubated. Report given directly from ICU RN to Dr Vasquez anesthesia at pre-op desk. Pt taken straight back to OR. Per ICU RN 2 physician signatures will be obtained for consents.

## 2017-03-07 NOTE — PROGRESS NOTES
Surgical critical care    Patient greeted on arrival from OR - ruptured AAA endograft repair, massive transfusion, shock pre op and intermittently intra op, abdominal compartment syndrome. Severe metabolic acidosis, coagulopathy.  Patient was tachycardic on arrival, then became progressively more hypotensive and bradycardic. Monitor showed irregular wide complexes. Pulseless. Patient was DNR status but bicarb, epinephrine, and Ca given IV. Ultrasound showed no cardiac activity.  Patient declared dead at 21:55 hrs. Dr. Vasquez present throughout.  Dr. Sutherland notified.    Yamil Morales MD

## 2017-03-07 NOTE — PROGRESS NOTES
1720: Dr. Galdamez at bedside to intubate patient. Medicated per EMAR. Successful intubation, patient tolerated.    1730: Pt to OR via bed, on monitor, O2. Chart and meds sent with Pt. RNs and RTs as escort.

## 2017-03-07 NOTE — DISCHARGE PLANNING
Medical Social Work  SW received a call from [facility] asking for NOK. SW reviewed chart and only found friend Vu. SW spoke with donor services who already have this information. Pt is a medical examiners case.

## 2017-03-08 LAB — GLUCOSE BLD-MCNC: 144 MG/DL (ref 65–99)

## 2017-03-09 NOTE — OP REPORT
DATE OF SERVICE:  03/06/2017    PREOPERATIVE DIAGNOSIS:  Ruptured infrarenal abdominal aortic aneurysm.    POSTOPERATIVE DIAGNOSIS:  Ruptured infrarenal abdominal aortic aneurysm.    PROCEDURES:  1.  Bilateral open femoral exposure.  2.  Catheter/sheath placement into the aorta, nonselective, (CPT code 75085).  3.  Endovascular repair of ruptured infrarenal abdominal aortic aneurysm with   a modular bifurcated device with 2 extension limbs.  4.  Radiological interpretation; endovascular repair of ruptured abdominal   aortic aneurysm.    SURGEON:  Otilio Sutherland MD    ASSISTANTS:    MD Deven León MD    INDICATIONS:  This is a 61-year-old gentleman who was previously in the   hospital in [place] for his pneumonia and there was subsequently a   deterioration in his mental status with worsening abdominal pain and had a CT   scan performed demonstrating a ruptured abdominal aortic aneurysm.  The   patient was subsequently transferred to [facility] for   further management.  Unfortunately, the patient arrived at a delayed time and   required emergent intubation at the time of arrival.  He was now emergently   being taken back to the operating room for repair of his abdominal aortic   aneurysm.  I reviewed his CT scan, he is a candidate for endovascular repair.    The patient would be taken emergently to the operating room where a consent   was obtained by two-physician consent.    PROCEDURES:  The patient was brought to the operating room and laid supine on   the table.  The abdomen as well as bilateral lower extremities were then   prepped and draped in a standard surgical fashion while the anesthesia team   was putting in a central line.  The patient was given Ancef prior to incision.    Bilateral superior iliac spine and pubic tubercle were identified.  An oblique   incision was made overlying the bilateral common femoral artery just below   the inguinal ligament.   Dissection carried down to the subcutaneous tissue   with Bovie cautery.  The fascia overlying the common femoral artery was opened   and the common femoral artery was dissected out proximally and distally and   controlled with vessel loops bilaterally.  Access was obtained in the right   common femoral artery with an access needle and the J-tipped wire was advanced   through the needle into the abdominal aorta under fluoroscopy.  A 12-American   sheath was placed into the right common femoral artery over the wire.  The   catheter and dilator removed.  Subsequently, the catheter was advanced up into   the descending thoracic aorta and a Reliant ___ over the wire and placed up   into the abdominal aorta at the level of diaphragm and insufflated to occlude   the abdominal aorta to prevent further bleeding.    Next, the left common iliac artery was selectively cannulated with an access   needle and a J-tipped wire was advanced through the needle into the left   common femoral artery.  A 12-American sheath was placed over the wire into the   left common femoral artery as well.  Subsequently, a second Reliant balloon   was placed at the left femoral wire up into the aorta and the right side was   taken down.  The left side of the balloon was inflated in the descending   thoracic aorta to control bleeding.  An Omniflush catheter was placed over the   right femoral wire into the abdominal aorta and placed at the L1 vertebral   body level to vandana the location of the renal arteries.  The aortogram with   iliofemoral runoff was then performed demonstrating the location of renal   arteries.  A main body stent graft measuring 25 mm x 103 mm was obtained and   prepped on the table.  This was opened and the left femoral wire was exchanged   out for the Lunderquist wire through the occlusion balloon that was already   up.  The balloon was subsequently taken down and removed over the wire and the   remainder of the main body device was  taken up to the level of the renal   arteries and deployed successfully right below the renal arteries and all the   way down to the opening of the contralateral gate.  Using the Omniflush   catheter ___ withdrawn over an angled Glidewire and the contralateral limb was   selectively cannulated through the right femoral approach.  The Omniflush   catheter was placed back into of the neck of the stent graft and rotated   freely suggesting we successfully cannulated the gate.  Subsequently, the wire   was exchanged for a Lunderquist wire through the catheter.  The ADI view of   the right iliac vessels was obtained.  A iliac extension limb measuring 16 x   20 x 124 mm was obtained and prepped on the table, but this was placed over   the right femoral Lunderquist wire and                 to the flow divider and   deployed successfully all the way down into the bifurcation of the right   common iliac artery.  The remainder of the endograft was deployed.  The   suprarenal fixation clamps were released prior to cannulation of the   contralateral gate.  The main body sheath was then pushed up into the thoracic   aorta a little bit and then the colon was retracted and the entire graft was   removed over the wire.  Subsequently, a 12-Ugandan sheath was placed over the   wire into the left common femoral artery and a JACOBSON view of the left iliac   vessels was obtained with the Omniflush catheter in place and measurements   were obtained.  A 16 x 24 x 93 mm left iliac limb was obtained and placed over   the left femoral wire up into the previous stent graft and with minimal   overlap deployed all the way down into the left common iliac artery   bifurcation.  Both of the sheaths were retracted and two 12-Ugandan sheaths   were placed back into the bilateral common femoral arteries.  Using 2 Reliant   balloons, these were placed simultaneously at the                abdominal   aorta and the graft was molded at the neck of the aneurysm  as well as along   the entire length of the iliac vessels.  Subsequent angiogram demonstrated   patency of the graft, although there was found to be a leak present of type 1.    A closer look demonstrated the graft had migrated further down below the   renal arteries.  Subsequently, a 25 mm cuff was obtained and placed over the   left femoral wire and the renal arteries were marked and the graft was   deployed from the renal arteries all the way into the neck of the new graft.    This was subsequently molded with a Reliant balloon.  Subsequent angiogram   demonstrated no evidence of endoleak             exclusion of the aneurysm.    The wire and sheath were removed              the femoral arteries were closed   with 5-0 Prolene suture in a running fashion.  The flow was restored through   the common femoral arteries.  There was good Doppler signal noted.  The   overlying femoral sheaths were then reapproximated with 3-0 Vicryl suture in   an interrupted fashion followed by closure of the subcutaneous tissues in   multiple layers and finally closure of skin with a 4-0 Monocryl suture in a   subcuticular fashion.    The patient received massive transfusion through the process and developed   what appeared to be compartment syndrome not only from the retroperitoneum,   but also significant fluid within the abdomen.  I obtained a consultation with   Dr. Yamil Morales from the trauma, who came to the operating room and   evaluated the abdomen and decided to do a laparotomy with placement of a wound   VAC to relieve the compartment syndrome.  This was dictated in a separate   operative note by him.  Patient was subsequently taken to the intensive care   unit.  I was present for the entire case.    FINDINGS:  Ruptured abdominal aortic aneurysm with a long infrarenal abdominal   aortic neck.  A successful              was performed of the abdominal aortic   aneurysm with a stent graft.  Unfortunately, he had significant  compartment   syndrome as a result of massive fluid resuscitation as well as retroperitoneal   hemorrhage requiring a laparotomy with wound VAC application.       ____________________________________     MD AMARIS HARRELL / ANIL    DD:  03/08/2017 18:45:35  DT:  03/08/2017 22:28:40    D#:  [id number]  Job#:  [id number]

## (undated) DEVICE — SODIUM CHL. INJ. 0.9% 500ML (24EA/CA 50CA/PF)

## (undated) DEVICE — SENSOR SPO2 NEO LNCS ADHESIVE (20/BX) SEE USER NOTES

## (undated) DEVICE — CHLORAPREP 26 ML APPLICATOR - ORANGE TINT(25/CA)

## (undated) DEVICE — NEEDLE SAFETY 18 GA X 1 1/2 IN (100EA/BX)

## (undated) DEVICE — NEPTUNE 4 PORT MANIFOLD - (20/PK)

## (undated) DEVICE — PROTECTOR ULNA NERVE - (36PR/CA)

## (undated) DEVICE — SUTURE GENERAL

## (undated) DEVICE — TUBE NG SALEM SUMP 16FR (50EA/CA)

## (undated) DEVICE — SET LEADWIRE 5 LEAD BEDSIDE DISPOSABLE ECG (1SET OF 5/EA)

## (undated) DEVICE — GLOVE BIOGEL SZ 7 SURGICAL PF LTX - (50PR/BX 4BX/CA)

## (undated) DEVICE — VESSELOOP MINI BLUE STERILE - SURG-I-LOOP (10EA/BX)

## (undated) DEVICE — KIT ROOM DECONTAMINATION

## (undated) DEVICE — DISH PETRI STERILE (50EA/CA)

## (undated) DEVICE — SHEET CARDIOVASCULAR - (4/CA)

## (undated) DEVICE — GUIDEWIRE HYDROPHILIC COATED STRAIGHT TIP GLIDEWIRE .035 X 180CM (5EA/BX)"

## (undated) DEVICE — DRAPE LARGE 3 QUARTER - (20/CA)

## (undated) DEVICE — SYRINGE DISP. 12 CC LL - (100/BX)

## (undated) DEVICE — GLOVE, BIOGEL ECLIPSE, SZ 7.0, PF LTX (50/BX)

## (undated) DEVICE — KIT ANESTHESIA W/CIRCUIT & 3/LT BAG W/FILTER (20EA/CA)

## (undated) DEVICE — LEAD SET 6 DISP. EKG NIHON KOHDEN (100EA/CA) [9859].

## (undated) DEVICE — GUIDEWIRES STARTER (PTFE COATED) ROSEN 0.035 180CM 4 1.5 MM J

## (undated) DEVICE — SUTURE 2-0 PROLENE SH D/A (36PK/BX)

## (undated) DEVICE — SUTURE 5-0 PROLENE C-1 D/A 24 (36PK/BX)"

## (undated) DEVICE — BLADE SURGICAL #15 - (50/BX 3BX/CA)

## (undated) DEVICE — NEEDLE THINWALL 19GA X 7CM

## (undated) DEVICE — SUCTION TIP STRAIGHT ARGYLE - 50EA/CA

## (undated) DEVICE — SUTURE 2-0 VICRYL PLUS CT-1 36 (36PK/BX)"

## (undated) DEVICE — DISC, CD-R PLAT 700MB MEMOREX

## (undated) DEVICE — HEAD HOLDER JUNIOR/ADULT

## (undated) DEVICE — Device

## (undated) DEVICE — MASK ANESTHESIA ADULT  - (100/CA)

## (undated) DEVICE — PACK ENDO VASCULAR - (6/CA)

## (undated) DEVICE — SYRINGE 30 ML LL (56/BX)

## (undated) DEVICE — SUTURE 3-0 PROLENE SH 36 (36PK/BX)"

## (undated) DEVICE — BALLOON RELIANT

## (undated) DEVICE — SUTURE 3-0 VICRYL PLUS SH - 27 INCH (36/BX)

## (undated) DEVICE — SUTURE 3-0 SILK 12 X 18 IN - (36/BX)

## (undated) DEVICE — ADHESIVE DERMABOND HVD MINI (12EA/BX)

## (undated) DEVICE — WATER IRRIG. STER. 1500 ML - (9/CA)

## (undated) DEVICE — CATHETER IV 14 GA X 2 ---SURG.& SDS ONLY---(200EA/CA)

## (undated) DEVICE — CELLSAVER STAT

## (undated) DEVICE — CLIP LG INTNL HRZN TI ESCP LGT - (20/BX)

## (undated) DEVICE — DRESSING LEUKOMED STERILE 11.75X4IN - (50/CA)

## (undated) DEVICE — SUTURE 4-0 MONOCRYL PLUS PS-2 - 27 INCH (36/BX)

## (undated) DEVICE — BAG DECANTER (50EA/CS)

## (undated) DEVICE — CELLSAVER PACK

## (undated) DEVICE — SUTURE 3-0 VICRYL PLUS CT-1 - 36 INCH (36/BX)

## (undated) DEVICE — LACTATED RINGERS INJ 1000 ML - (14EA/CA 60CA/PF)

## (undated) DEVICE — PENCIL ELECTSURG 10FT BTN SWH - (50/CA)

## (undated) DEVICE — KIT INTROPERCUTANEOUS SHEATH - 8.5 FR W/MAX BARRIER AND BIOPATCH  (5/CA)

## (undated) DEVICE — VESSELOOP SUPER MAXI BLUE - SURG-I-LOOP (2EA/PK 10PK/BX)

## (undated) DEVICE — TRAY SURESTEP FOLEY TEMP SENSING 16FR (10EA/CA) ORDER  #18764 FOR TEMP FOLEY ONLY

## (undated) DEVICE — BLADE SURGICAL #11 - (50/BX)

## (undated) DEVICE — SET FLUID WARMING STANDARD FLOW - (10/CA)

## (undated) DEVICE — CLEANER ELECTRO-SURGICAL TIP - (25/BX 4BX/CA)

## (undated) DEVICE — SUTURE 2-0 SILK SH 24 (36PK/BX)"

## (undated) DEVICE — DRAPE IOBAN II 23 IN X 33 IN - (10/BX)

## (undated) DEVICE — GLOVE BIOGEL INDICATOR SZ 7.5 SURGICAL PF LTX - (50PR/BX 4BX/CA)

## (undated) DEVICE — ELECTRODE 850 FOAM ADHESIVE - HYDROGEL RADIOTRNSPRNT (50/PK)

## (undated) DEVICE — CLIP SM INTNL HRZN TI ESCP LGT - (24EA/PK 25PK/BX)

## (undated) DEVICE — DEVICE INFLATION DIGITAL BLUE DIAMOND (5EA/BX)

## (undated) DEVICE — SUTURE 2-0 SILK 12 X 18" (36PK/BX)"

## (undated) DEVICE — GOWN SURGEONS X-LARGE - DISP. (30/CA)

## (undated) DEVICE — KIT DOUBLE LUMEN 9 FR DISTAL (5/CA)

## (undated) DEVICE — SUTURE 1 SILK 2 X 60 (36PK/BX)

## (undated) DEVICE — SUTURE 2-0 SILK SH (36PK/BX)

## (undated) DEVICE — STOPCOCK MALE 4-WAY - (50/CA)

## (undated) DEVICE — SUTURE 2-0 SILK 12 REEL (12PK/BX)"

## (undated) DEVICE — SUTURE 3-0 SILK SH C/R 18 IN - (12/BX)

## (undated) DEVICE — GLOVE BIOGEL SZ 6 PF LATEX - (50EA/BX 4BX/CA)

## (undated) DEVICE — TOWELS CLOTH SURGICAL - (4/PK 20PK/CA)

## (undated) DEVICE — GELAQUASONIC 100 ULTRASOUND - 48/BX 20GM STERILE FOIL POUCH

## (undated) DEVICE — FILTER BLOOD TRANSFUSION - (40/CA) (PALL)

## (undated) DEVICE — SUTURE 3-0 VICRYL PLUS SH - 8X 18 INCH (12/BX)

## (undated) DEVICE — BOVIE  BLADE 6 EXTENDED - (50/PK)

## (undated) DEVICE — SODIUM CHL IRRIGATION 0.9% 1000ML (12EA/CA)

## (undated) DEVICE — SPONGE PEANUT - (5/PK 50PK/CA)

## (undated) DEVICE — SET EXTENSION WITH 2 PORTS (48EA/CA) ***PART #2C8610 IS A SUBSTITUTE*****

## (undated) DEVICE — SOD. CHL. INJ. 0.9% 1000 ML - (14EA/CA 60CA/PF)

## (undated) DEVICE — STAPLER SKIN DISP - (6/BX 10BX/CA) VISISTAT

## (undated) DEVICE — BURETTE N-VENT 150 X 60 (SOLUSET)  (48EA/CA)

## (undated) DEVICE — SUTURE CV

## (undated) DEVICE — SHEATH INTRODUCER PERFORMER 12F X 30CM

## (undated) DEVICE — BAG RESUSCITATION DISPOSABLE - WITH MASK (10 EA/CA)

## (undated) DEVICE — CLIP MED INTNL HRZN TI ESCP - (25/BX)

## (undated) DEVICE — SUCTION INSTRUMENT YANKAUER BULBOUS TIP W/O VENT (50EA/CA)

## (undated) DEVICE — KIT SYRINGE SINGLE FOR MEDRAD MARK V + PROVIS (50/CA)

## (undated) DEVICE — TRAY, CV CATH MULTI-LUM.AK-25703

## (undated) DEVICE — VAC CANISTER W/GEL 500ML - FITS NEW MACHINES (10EA/CA)

## (undated) DEVICE — KIT CATHETERIZATION TWO-LUMEN VENOUS 8FR (5EA/CA)

## (undated) DEVICE — GLOVE BIOGEL INDICATOR SZ 8 SURGICAL PF LTX - (50/BX 4BX/CA)

## (undated) DEVICE — PAD LAP STERILE 18 X 18 - (5/PK 40PK/CA)

## (undated) DEVICE — TRANSDUCER ADULT DISP. SINGLE BONDED STERILE - (20EA/CA)

## (undated) DEVICE — TUBING CLEARLINK DUO-VENT - C-FLO (48EA/CA)

## (undated) DEVICE — SPONGE GAUZESTER 4 X 4 4PLY - (128PK/CA)

## (undated) DEVICE — VESSELOOP MAXI BLUE STERILE- SURG-I-LOOP (10EA/BX)

## (undated) DEVICE — PAD PREP 24 X 48 CUFFED - (100/CA)

## (undated) DEVICE — KIT RADIAL ARTERY 20GA W/MAX BARRIER AND BIOPATCH  (5EA/CA) #10740 IS FOR THE SET RADIAL ARTERIAL

## (undated) DEVICE — GOWN WARMING STANDARD FLEX - (30/CA)

## (undated) DEVICE — SLEEVE, VASO, THIGH, MED

## (undated) DEVICE — WIRE GUIDE LUNDERQST .035X260  (5EA/BX)

## (undated) DEVICE — CATHETER THERMALDILUTION SWAN - (5EA/CA)

## (undated) DEVICE — GLOVE BIOGEL SZ 7.5 SURGICAL PF LTX - (50PR/BX 4BX/CA)

## (undated) DEVICE — SYRINGE SAFETY 10 ML 18 GA X 1 1/2 BLUNT LL (100/BX 4BX/CA)

## (undated) DEVICE — ELECTRODE DUAL RETURN W/ CORD - (50/PK)

## (undated) DEVICE — PACK MAJOR BASIN - (2EA/CA)

## (undated) DEVICE — SET BIFURCATED BLOOD - (48EA/CS)

## (undated) DEVICE — DRESSING TRANSPARENT FILM TEGADERM 2.375 X 2.75"  (100EA/BX)"

## (undated) DEVICE — CANISTER SUCTION 3000ML MECHANICAL FILTER AUTO SHUTOFF MEDI-VAC NONSTERILE LF DISP  (40EA/CA)

## (undated) DEVICE — SUTURE 1 PROLENE TP-1 60 (12PK/BX)"

## (undated) DEVICE — CLIP MED LG INTNL HRZN TI ESCP - (20/BX)

## (undated) DEVICE — POLY UMBILICAL TAPE 1/8X30 - (36/BX)

## (undated) DEVICE — DRAPE MAYO STAND - (30/CA)

## (undated) DEVICE — BLADE SURGICAL CLIPPER - (50EA/CA)

## (undated) DEVICE — SUTURE 2-0 VICRYL PLUS SH - 27 INCH (36/BX)

## (undated) DEVICE — SUTURE 5-0 PROLENE C-1 -(36PK/BX)